# Patient Record
Sex: FEMALE | Race: WHITE | Employment: OTHER | URBAN - NONMETROPOLITAN AREA
[De-identification: names, ages, dates, MRNs, and addresses within clinical notes are randomized per-mention and may not be internally consistent; named-entity substitution may affect disease eponyms.]

---

## 2021-03-02 ENCOUNTER — TELEPHONE (OUTPATIENT)
Dept: OBGYN CLINIC | Age: 76
End: 2021-03-02

## 2021-03-02 NOTE — TELEPHONE ENCOUNTER
Pt is scheduled with Dr Tai Moody 4-22 for annual exam but will need a refill called in to [facility] (short term) to get her through until the appt.

## 2021-03-03 RX ORDER — DARIFENACIN HYDROBROMIDE 7.5 MG/1
7.5 TABLET, EXTENDED RELEASE ORAL DAILY
Qty: 90 TABLET | Refills: 0 | Status: SHIPPED | OUTPATIENT
Start: 2021-03-03 | End: 2021-04-22

## 2021-04-22 ENCOUNTER — OFFICE VISIT (OUTPATIENT)
Dept: OBGYN CLINIC | Age: 76
End: 2021-04-22
Payer: MEDICARE

## 2021-04-22 VITALS
HEIGHT: 60 IN | BODY MASS INDEX: 31.96 KG/M2 | WEIGHT: 162.8 LBS | DIASTOLIC BLOOD PRESSURE: 60 MMHG | SYSTOLIC BLOOD PRESSURE: 110 MMHG

## 2021-04-22 DIAGNOSIS — N32.81 OVERACTIVE BLADDER: ICD-10-CM

## 2021-04-22 DIAGNOSIS — Z01.419 WOMEN'S ANNUAL ROUTINE GYNECOLOGICAL EXAMINATION: Primary | ICD-10-CM

## 2021-04-22 PROCEDURE — G0101 CA SCREEN;PELVIC/BREAST EXAM: HCPCS | Performed by: OBSTETRICS & GYNECOLOGY

## 2021-04-22 RX ORDER — LISINOPRIL 10 MG/1
TABLET ORAL
COMMUNITY
Start: 2021-03-11

## 2021-04-22 RX ORDER — ESCITALOPRAM OXALATE 10 MG/1
TABLET ORAL
COMMUNITY
Start: 2021-03-11

## 2021-04-22 RX ORDER — TOLTERODINE 4 MG/1
4 CAPSULE, EXTENDED RELEASE ORAL DAILY
Qty: 90 CAPSULE | Refills: 3 | Status: SHIPPED | OUTPATIENT
Start: 2021-04-22 | End: 2022-05-16 | Stop reason: SDUPTHER

## 2021-04-22 ASSESSMENT — ENCOUNTER SYMPTOMS
CONSTIPATION: 0
ABDOMINAL PAIN: 0
DIARRHEA: 0
SHORTNESS OF BREATH: 0

## 2021-04-22 NOTE — PROGRESS NOTES
Chaperone for Intimate Exam   Chaperone was offered as part of the rooming process. Patient declined and agrees to continue with exam without a chaperone.
Symptoms of decreased mood absent    **If either question is answered in a  positive fashion then completethe PHQ9 Scoring Evaluation and make the appropriate referral**      Gynecologic History:       No LMP recorded. Patient is postmenopausal.    Sexually Active: No    STD History: No     Hormone Replacement Exposure: No      Genetic Qualified Family History of Breast, Ovarian , Colon or Uterine Cancer:No   If YES see scanned worksheet. Preventative Health Testing:  Colposcopy History:   Date of Last Mammogram: 2021  Date of Last Colonoscopy:   Date of Last Bone Density:      ______________________________________________________________________    REVIEW OF SYSTEMS:       Review of Systems   Constitutional: Negative for chills, fatigue and fever. Respiratory: Negative for shortness of breath. Cardiovascular: Negative for chest pain. Gastrointestinal: Negative for abdominal pain, constipation and diarrhea. Genitourinary: Negative for dysuria, enuresis, frequency, menstrual problem, pelvic pain, urgency and vaginal bleeding. Neurological: Negative for dizziness, light-headedness and headaches. PHYSICAL EXAM:    Physical Exam  Constitutional:       Appearance: Normal appearance. Genitourinary:      Pelvic exam was performed with patient in the lithotomy position. Vulva, vagina, right adnexa and left adnexa normal.      Cervix is absent. Uterus is absent. HENT:      Head: Atraumatic. Mouth/Throat:      Mouth: Mucous membranes are moist.   Eyes:      Extraocular Movements: Extraocular movements intact. Pupils: Pupils are equal, round, and reactive to light. Neck:      Musculoskeletal: Normal range of motion. Cardiovascular:      Rate and Rhythm: Normal rate. Pulmonary:      Effort: Pulmonary effort is normal.   Chest:      Breasts:         Right: Normal.         Left: Normal.   Abdominal:      General: There is no distension. Palpations: Abdomen is soft.

## 2022-05-02 RX ORDER — TOLTERODINE 4 MG/1
CAPSULE, EXTENDED RELEASE ORAL
Qty: 90 CAPSULE | Refills: 3 | OUTPATIENT
Start: 2022-05-02

## 2022-05-16 ENCOUNTER — OFFICE VISIT (OUTPATIENT)
Dept: OBGYN CLINIC | Age: 77
End: 2022-05-16
Payer: MEDICARE

## 2022-05-16 VITALS — SYSTOLIC BLOOD PRESSURE: 129 MMHG | BODY MASS INDEX: 31.53 KG/M2 | WEIGHT: 162.8 LBS | DIASTOLIC BLOOD PRESSURE: 78 MMHG

## 2022-05-16 DIAGNOSIS — N32.81 OVERACTIVE BLADDER: Primary | ICD-10-CM

## 2022-05-16 PROCEDURE — 99213 OFFICE O/P EST LOW 20 MIN: CPT | Performed by: NURSE PRACTITIONER

## 2022-05-16 RX ORDER — TOLTERODINE 4 MG/1
4 CAPSULE, EXTENDED RELEASE ORAL DAILY
Qty: 90 CAPSULE | Refills: 3 | Status: SHIPPED | OUTPATIENT
Start: 2022-05-16

## 2022-05-16 NOTE — PROGRESS NOTES
PROBLEM VISIT     Date of service: 2022    Glenda [place]  Is a 68 y.o. female    PT's PCP is: Cori Horowitz MD     : 1945                                             Subjective:       No LMP recorded. Patient is postmenopausal.   OB History    Para Term  AB Living   4 3     1     SAB IAB Ectopic Molar Multiple Live Births   1         3      # Outcome Date GA Lbr Fan/2nd Weight Sex Delivery Anes PTL Lv   4 SAB            3 Para            2 Para            1 Para                 Social History     Tobacco Use   Smoking Status Current Every Day Smoker    Packs/day: 0.50    Types: Cigarettes    Start date:    Smokeless Tobacco Never Used        Social History     Substance and Sexual Activity   Alcohol Use Yes    Comment: occasional       Allergies: Patient has no known allergies. Current Outpatient Medications:     tolterodine (DETROL LA) 4 MG extended release capsule, Take 1 capsule by mouth daily, Disp: 90 capsule, Rfl: 3    lisinopril (PRINIVIL;ZESTRIL) 10 MG tablet, , Disp: , Rfl:     escitalopram (LEXAPRO) 10 MG tablet, , Disp: , Rfl:     B COMPLEX VITAMINS IJ, Take by mouth, Disp: , Rfl:     Multiple Vitamin (MULTIVITAMIN PO), Take by mouth, Disp: , Rfl:     vitamin D 25 MCG (1000 UT) CAPS, Take by mouth, Disp: , Rfl:     Social History     Substance and Sexual Activity   Sexual Activity Not Currently     Chief Complaint   Patient presents with    Medication Refill     Annual Detrol refill. Up to date on annual and mammograms. PE:  Vital Signs  Blood pressure 129/78, weight 162 lb 12.8 oz (73.8 kg). HPI: Patient here for annual medication follow up on Detrol. Desires refill of medication. Has increased frequency and some leaking at night. Admits to drinking a lot of water. UTD on wellness and screening mammogram.     PT denies fever, chills, nausea and vomiting       Review of Systems   Constitutional: Negative.     Genitourinary: Positive for frequency (at night). Negative for difficulty urinating, dysuria, hematuria, vaginal bleeding and vaginal discharge. Physical Exam  Constitutional:       Appearance: Normal appearance. HENT:      Head: Normocephalic. Pulmonary:      Effort: Pulmonary effort is normal.   Musculoskeletal:         General: Normal range of motion. Neurological:      General: No focal deficit present. Mental Status: She is alert. Psychiatric:         Mood and Affect: Mood normal.         Behavior: Behavior normal.         Assessment and Plan          Diagnosis Orders   1. Overactive bladder  tolterodine (DETROL LA) 4 MG extended release capsule     Desires refill of current medication, offered trial of alternative OAB meds since continues to experience night time frequency and leakage. Reports better than previous and desires to continue with current medications. I am having Leticia Bundy maintain her lisinopril, escitalopram, B COMPLEX VITAMINS IJ, Multiple Vitamin (MULTIVITAMIN PO), vitamin D, and tolterodine. Return in about 1 year (around 5/16/2023) for yearly. There are no Patient Instructions on file for this visit.     Time spent 20 minutes      AYALA Barron NP,5/16/2022 12:28 PM

## 2023-04-24 ENCOUNTER — OFFICE VISIT (OUTPATIENT)
Dept: OBGYN CLINIC | Age: 78
End: 2023-04-24
Payer: MEDICARE

## 2023-04-24 VITALS
SYSTOLIC BLOOD PRESSURE: 122 MMHG | WEIGHT: 158.2 LBS | HEIGHT: 60 IN | BODY MASS INDEX: 31.06 KG/M2 | DIASTOLIC BLOOD PRESSURE: 68 MMHG

## 2023-04-24 DIAGNOSIS — Z01.419 WOMEN'S ANNUAL ROUTINE GYNECOLOGICAL EXAMINATION: Primary | ICD-10-CM

## 2023-04-24 DIAGNOSIS — N32.81 OVERACTIVE BLADDER: ICD-10-CM

## 2023-04-24 PROCEDURE — G0101 CA SCREEN;PELVIC/BREAST EXAM: HCPCS | Performed by: NURSE PRACTITIONER

## 2023-04-24 RX ORDER — TOLTERODINE 4 MG/1
4 CAPSULE, EXTENDED RELEASE ORAL DAILY
Qty: 90 CAPSULE | Refills: 3 | Status: SHIPPED | OUTPATIENT
Start: 2023-04-24

## 2023-04-24 ASSESSMENT — ENCOUNTER SYMPTOMS
CONSTIPATION: 0
DIARRHEA: 0
ABDOMINAL PAIN: 0
CHEST TIGHTNESS: 0
SHORTNESS OF BREATH: 0

## 2023-04-24 NOTE — PROGRESS NOTES
YEARLY PHYSICAL    Date of service: 2023    Daja Malin  Is a 66 y.o. female    PT's PCP is: Blane Youssef MD     : 1945                                         Chaperone for Intimate Exam  Chaperone was offered as part of the rooming process. Patient declined and agrees to continue with exam without a chaperone. Chaperone: n/a      Subjective:       No LMP recorded. Patient has had a hysterectomy. Are your menses regular: not applicable    OB History    Para Term  AB Living   4 3 3   1 3   SAB IAB Ectopic Molar Multiple Live Births   1         3      # Outcome Date GA Lbr Fan/2nd Weight Sex Delivery Anes PTL Lv   4 SAB            3 Term         THAO   2 Term         THAO   1 Term         THAO        Social History     Tobacco Use   Smoking Status Every Day    Packs/day: 0.50    Types: Cigarettes    Start date:    Smokeless Tobacco Never        Social History     Substance and Sexual Activity   Alcohol Use Yes    Comment: occasional       Family History   Problem Relation Age of Onset    Cancer Son        Any family history of breast or ovarian cancer: No    Any family history of blood clots: No      Allergies: Patient has no known allergies. Current Outpatient Medications:     tolterodine (DETROL LA) 4 MG extended release capsule, Take 1 capsule by mouth daily, Disp: 90 capsule, Rfl: 3    lisinopril (PRINIVIL;ZESTRIL) 10 MG tablet, , Disp: , Rfl:     escitalopram (LEXAPRO) 10 MG tablet, , Disp: , Rfl:     B COMPLEX VITAMINS IJ, Take by mouth, Disp: , Rfl:     Multiple Vitamin (MULTIVITAMIN PO), Take by mouth, Disp: , Rfl:     vitamin D 25 MCG (1000 UT) CAPS, Take by mouth, Disp: , Rfl:     Social History     Substance and Sexual Activity   Sexual Activity Not Currently       Any bleeding or pain with intercourse: n/a    Last Yearly:  21    Last pap: ?     Last HPV: ?    Last Mammogram: 2 yrs

## 2024-04-25 ENCOUNTER — OFFICE VISIT (OUTPATIENT)
Dept: OBGYN CLINIC | Age: 79
End: 2024-04-25
Payer: MEDICARE

## 2024-04-25 VITALS
BODY MASS INDEX: 31.02 KG/M2 | WEIGHT: 158 LBS | SYSTOLIC BLOOD PRESSURE: 128 MMHG | HEIGHT: 60 IN | DIASTOLIC BLOOD PRESSURE: 78 MMHG

## 2024-04-25 DIAGNOSIS — N32.81 OVERACTIVE BLADDER: Primary | ICD-10-CM

## 2024-04-25 PROCEDURE — 99213 OFFICE O/P EST LOW 20 MIN: CPT | Performed by: NURSE PRACTITIONER

## 2024-04-25 PROCEDURE — 1123F ACP DISCUSS/DSCN MKR DOCD: CPT | Performed by: NURSE PRACTITIONER

## 2024-04-25 RX ORDER — TOLTERODINE 4 MG/1
4 CAPSULE, EXTENDED RELEASE ORAL DAILY
Qty: 90 CAPSULE | Refills: 3 | Status: SHIPPED | OUTPATIENT
Start: 2024-04-25

## 2024-04-25 RX ORDER — CALCIUM CARBONATE/VITAMIN D3 500 MG-10
TABLET ORAL
COMMUNITY
Start: 2015-08-17

## 2024-04-25 RX ORDER — TOLTERODINE 4 MG/1
4 CAPSULE, EXTENDED RELEASE ORAL DAILY
Qty: 90 CAPSULE | Refills: 3 | Status: SHIPPED | OUTPATIENT
Start: 2024-04-25 | End: 2024-04-25

## 2024-04-25 ASSESSMENT — ENCOUNTER SYMPTOMS
VOMITING: 0
RESPIRATORY NEGATIVE: 1
DIARRHEA: 0
CONSTIPATION: 0
NAUSEA: 0

## 2024-04-25 NOTE — PROGRESS NOTES
Continues to do yearly mammograms.     PT denies fever, chills, nausea and vomiting       Review of Systems   Constitutional: Negative.    Respiratory: Negative.     Cardiovascular: Negative.    Gastrointestinal:  Negative for constipation, diarrhea, nausea and vomiting.   Genitourinary:  Negative for difficulty urinating, dysuria, frequency, pelvic pain, vaginal bleeding and vaginal pain.   Neurological: Negative.        Physical Exam  Constitutional:       General: She is not in acute distress.     Appearance: Normal appearance. She is not ill-appearing.   HENT:      Head: Normocephalic.   Pulmonary:      Effort: Pulmonary effort is normal.   Musculoskeletal:         General: Normal range of motion.   Neurological:      General: No focal deficit present.      Mental Status: She is alert.   Psychiatric:         Mood and Affect: Mood normal.         Behavior: Behavior normal.         Assessment and Plan          Diagnosis Orders   1. Overactive bladder  tolterodine (DETROL LA) 4 MG extended release capsule          happy with bladder control, refills of Detrol sent.       I am having Leticia Bundy maintain her lisinopril, escitalopram, B COMPLEX VITAMINS IJ, Multiple Vitamin (MULTIVITAMIN PO), vitamin D, Calcium Carb-Cholecalciferol, and tolterodine.    Return if symptoms worsen or fail to improve.    There are no Patient Instructions on file for this visit.    Time spent 20 minutes      AYALA Peter NP,4/25/2024 10:48 AM

## 2025-05-12 ENCOUNTER — TELEPHONE (OUTPATIENT)
Dept: OBGYN CLINIC | Age: 80
End: 2025-05-12

## 2025-05-12 DIAGNOSIS — N32.81 OVERACTIVE BLADDER: ICD-10-CM

## 2025-05-12 NOTE — TELEPHONE ENCOUNTER
Patient called stating that she was due for a refill and went to call it in and the pharmacy told her she didn't have any available. She is scheduled to see jeremy when she is back in June. Patient was hoping a refill could be sent in to get her to her appointment.

## 2025-05-13 RX ORDER — TOLTERODINE 4 MG/1
4 CAPSULE, EXTENDED RELEASE ORAL DAILY
Qty: 90 CAPSULE | Refills: 0 | Status: SHIPPED | OUTPATIENT
Start: 2025-05-13

## 2025-05-13 NOTE — TELEPHONE ENCOUNTER
Can patient have a refill of her Detrol to last until her next visit? I pended the  medication. Per Chris she wants it to go to [facility].

## 2025-06-12 ENCOUNTER — OFFICE VISIT (OUTPATIENT)
Dept: OBGYN CLINIC | Age: 80
End: 2025-06-12
Payer: MEDICARE

## 2025-06-12 VITALS — SYSTOLIC BLOOD PRESSURE: 130 MMHG | DIASTOLIC BLOOD PRESSURE: 82 MMHG | WEIGHT: 156.8 LBS | BODY MASS INDEX: 30.62 KG/M2

## 2025-06-12 DIAGNOSIS — N32.81 OVERACTIVE BLADDER: ICD-10-CM

## 2025-06-12 PROCEDURE — 1160F RVW MEDS BY RX/DR IN RCRD: CPT | Performed by: NURSE PRACTITIONER

## 2025-06-12 PROCEDURE — 1159F MED LIST DOCD IN RCRD: CPT | Performed by: NURSE PRACTITIONER

## 2025-06-12 PROCEDURE — 99213 OFFICE O/P EST LOW 20 MIN: CPT | Performed by: NURSE PRACTITIONER

## 2025-06-12 PROCEDURE — 1123F ACP DISCUSS/DSCN MKR DOCD: CPT | Performed by: NURSE PRACTITIONER

## 2025-06-12 RX ORDER — METHIMAZOLE 5 MG/1
TABLET ORAL
COMMUNITY
Start: 2024-06-20

## 2025-06-12 RX ORDER — TOLTERODINE 4 MG/1
4 CAPSULE, EXTENDED RELEASE ORAL DAILY
Qty: 90 CAPSULE | Refills: 3 | Status: SHIPPED | OUTPATIENT
Start: 2025-06-12

## 2025-06-12 ASSESSMENT — ENCOUNTER SYMPTOMS
SHORTNESS OF BREATH: 0
GASTROINTESTINAL NEGATIVE: 1
CHEST TIGHTNESS: 0

## 2025-06-12 NOTE — PROGRESS NOTES
annual medication follow up on Detrol. Reports urinary symptoms are well controlled at this time. Denies any bothersome side effects from medications. Continues to do yearly mammograms and follows with PCP for wellness labs.     PT denies fever, chills, nausea and vomiting       Review of Systems   Constitutional: Negative.    Respiratory:  Negative for chest tightness and shortness of breath.    Cardiovascular:  Negative for chest pain and palpitations.   Gastrointestinal: Negative.    Genitourinary:  Negative for difficulty urinating, dysuria, frequency, vaginal bleeding, vaginal discharge and vaginal pain.       Physical Exam  Constitutional:       General: She is not in acute distress.     Appearance: Normal appearance. She is not ill-appearing.   HENT:      Head: Normocephalic.   Pulmonary:      Effort: Pulmonary effort is normal.   Musculoskeletal:         General: Normal range of motion.   Neurological:      General: No focal deficit present.      Mental Status: She is alert.   Psychiatric:         Mood and Affect: Mood normal.         Behavior: Behavior normal.         Assessment and Plan          Diagnosis Orders   1. Overactive bladder  tolterodine (DETROL LA) 4 MG extended release capsule                I am having Leticia JOANNA Bundy maintain her lisinopril, escitalopram, B COMPLEX VITAMINS IJ, Multiple Vitamin (MULTIVITAMIN PO), vitamin D, Calcium Carb-Cholecalciferol, esomeprazole, methIMAzole, and tolterodine.    Return in about 1 year (around 6/12/2026) for yearly.    There are no Patient Instructions on file for this visit.    Time spent 20 minutes      AYALA Peter NP,6/12/2025 10:34 AM